# Patient Record
Sex: MALE | Race: OTHER | Employment: OTHER | URBAN - METROPOLITAN AREA
[De-identification: names, ages, dates, MRNs, and addresses within clinical notes are randomized per-mention and may not be internally consistent; named-entity substitution may affect disease eponyms.]

---

## 2018-03-21 NOTE — PATIENT DISCUSSION
(H10.89) Other conjunctivitis - Assesment : Examination reveals conjunctivitis, likely allergic conjunctivitis. Does not appear to be bacterial or viral. - Plan : Discussed symptoms and treatment options with patient. Recommend Bepreve OU BID allergy drops along with artificial tears OU BID-TID and PRN for comfort. Advised patient to call our office with decreased vision or increased symptoms. Written Rx for Bepreve given to patient. Handouts given on dry eye, samples of Bepreve and ATs given. RV in 1-2 Weeks for Follow Up, or RV as needed or with worsening symptoms.

## 2018-04-05 NOTE — PATIENT DISCUSSION
Viral Conjunctivits OU -- The condition was discussed with the patient. Cool compresses and artificial tears were recommended. The mechanism of transmission was explained and the patient was educated to wash hands and use separate towels and bedding.

## 2018-04-05 NOTE — PATIENT DISCUSSION
1.  Bacterial Conjunctivitis OU with secondary viral --The condition was discussed with the patient. The mechanism of transmission was explained. The patient was advised to wash his hands and use separate towels and bedding. Pt was given Tobrx and pt feels it was worse. Dc Tobrex. Rx Vigamox tid and PF tid ou. Tears prn and keep clean. 2.   RTN 10 days OC

## 2018-04-16 NOTE — PATIENT DISCUSSION
1.  Nuclear Sclerotic Cataract OU:  OD>OS. OD affecting visions. Explained how cataracts can effect vision. Recommend clinical observation. The patient was advised to contact us if any change or worsening of vision. 2. Dry Eyes OU:  Start artificials tears. Encouraged regular use. 3.  Bacterial Conjunctivitis OU with secondary viral --  Much better-  Dc Vigamox PF bid 5 days qd 5 days then DC. Use tears. Tears prn and keep clean. 4. RTN 6 mths dFTA- eval OD cat.

## 2018-05-07 NOTE — PATIENT DISCUSSION
1.  Viral Conjunctivitis OU with secondary bacterial --The condition was discussed with the patient. The mechanism of transmission was explained. The patient was advised to wash his hands and use separate towels and bedding. Pt was doing well with previous meds. Pt stopped meds and started using her makeup that she used when she was infected previously. Possibly recontamination. Rx Vigamox tid and Lotemax tid ou. Tears prn and keep clean. Discard all makeup and dont use new stuff. Do a VERY slow taper.   2.   RTN 10 days OC

## 2018-05-18 NOTE — PATIENT DISCUSSION
1.  Viral Conjunctivitis OU with secondary bacterial --  Much improved. The condition was discussed with the patient. The mechanism of transmission was explained. The patient was advised to wash his hands and use separate towels and bedding. Pt was doing well with previous meds. Pt stopped meds and started using her makeup that she used when she was infected previously. Possibly recontamination. Rx Vigamox tid and Lotemax tid ou. Tears prn and keep clean. Discard all makeup and dont use new stuff. Do a VERY slow taper.   2.   RTN 10 days OC

## 2018-05-18 NOTE — PATIENT DISCUSSION
1.  Viral Conjunctivitis OU with secondary bacterial --  Much improved. The condition was discussed with the patient. The mechanism of transmission was explained. The patient was advised to wash his hands and use separate towels and bedding. Pt was doing well with previous meds. Pt discarded  her makeup. Finish Vigamox bid and Taper Lotemax bid 10 days qd 10 days then DC. Tears prn and keep clean. Discard all makeup and dont use new stuff. Do a VERY slow taper.  2.   RTN 20 days OC

## 2018-06-07 NOTE — PATIENT DISCUSSION
1.  Viral Conjunctivitis OU with secondary bacterial --  Resolved. Did a very slow taper and pt doing better. Tears prn and keep clean. Discard all makeup and dont use new stuff. Do a VERY slow taper. 2.   SPK OU-  use tears at least 2-3x per day. 3.   RTN  PRN

## 2020-10-09 NOTE — PATIENT DISCUSSION
1.  Nuclear Sclerotic Cataract OU:  OD>OS. OD affecting visions. Getting worse and affecting visions. Explained how cataracts can effect vision. Disc wit pt and wants to ahve evaluation for sx. Pt has been used to [facility] and would like same. PORE -1.75/plano2. Dry Eyes OU:  Start artificials tears. Encouraged regular use. 3. 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

## 2020-11-05 NOTE — PATIENT DISCUSSION
1.  Discussed the risks benefits alternatives and limitations of cataract surgery including infection bleeding loss of vision retinal tears detachment. The patient stated a full understanding and a desire to proceed with the procedure in both eyes. Refractive options were reviewed. Patient has elected to have monovision. The patient has tried monovision in the past.  I have recommended ORA guidance to confirm lens power choice. There is no guarantee of perfect uncorrected acuity and the possible need for enhancement was discussed. The patient may need glasses for night driving and to fine tune the vision. Schedule KPE/IOL OS/OD pore -1.75OD plano OS2. Dry Eye OU:  Continue current management with Artificial Tears. 3.  Return for an appointment for Admit. with Dr. Maile Lux.

## 2020-12-02 NOTE — PATIENT DISCUSSION
Cataract OU: Discussed the risks benefits alternatives and limitations of cataract surgery including infection bleeding loss of vision retinal tears detachment. The patient stated a full understanding and a desire to proceed with the surgery in both eyes. Refractive options were reviewed. Patient has elected to have monovision and has tried monovision in the past.  The patient will have ORA guidance to confirm lens power choice. There is no guarantee of perfect uncorrected acuity and the possible need for enhancement was discussed. The patient may need glasses for night driving and to fine tune the vision.

## 2020-12-02 NOTE — PATIENT DISCUSSION
Cataract OD: Discussed the risks benefits alternatives and limitations of cataract surgery including infection bleeding loss of vision retinal tears detachment. The patient stated a full understanding and a desire to proceed with the procedure in the right eye. Refractive options were reviewed. Patient has elected to be optimized for near vision in the right eye. The patient will need glasses for distance.

## 2020-12-15 NOTE — PATIENT DISCUSSION
1.  Pseudophakia OD - Post-Op Day #1 - Cataract Surgery Right Eye (OD) -[facility]man-   MONO--OD PORE -1.75--doing well. Continue po drops as instructed. Call office with symptoms of pain redness or decreased vision right eye. Explained to pt and she saw how well she was seeing in new eye. 2.  Cat scheduled 1 week3. Use more tears qwid-gave sample4.   RTN 1 week PO

## 2020-12-22 NOTE — PATIENT DISCUSSION
1.  Pseudophake OS--Post-Op Day #1 - Cataract Surgery Left Eye (OS) - DIST--Destenza--doing well. Tears prn. Continue postop drops as directed. Call office with symptoms of pain redness or decreased vision in operative eye.  1 drop of tobramycin instilled2. Pseudophakia OD - 1 week PO - Cataract Surgery Right Eye (OD) -[facility]-   MONO--NEAR---doing well. Continue po drops as instructed. Call office with symptoms of pain redness or decreased vision right eye. Explained to pt and she saw how well she was seeing in new eye. 3.  Use more tears qid-gave sample4. MONO--5.   RTN 1 week PO

## 2021-02-10 NOTE — PATIENT DISCUSSION
1.  Pseudophake OU--  MONO_-- DIST-OS/Near OD. --Destenza-- 2. Use more tears qid-gave sample3. MONO--4.   RTN  5 mths CE--after cat sx ou

## 2021-06-16 NOTE — PATIENT DISCUSSION
1.  Pseudophake OU--  MONO_-- DIST-OS/Near OD. --Destenza-- Pt happy 2.  DRy eyes---Use more tears qid-gave sample3. MONO--4. RTN  1 yr CE.

## 2021-06-16 NOTE — PATIENT DISCUSSION
1.  Pseudophake OU--  MONO_-- DIST-OS/Near OD. --Destenza-- Pt happy 2.  DRy eyes---Use more tears qid-gave sample3. MONO--4.   RTN  1 yr CE

## 2022-01-21 ENCOUNTER — CONSULTATION/EVALUATION (OUTPATIENT)
Dept: URBAN - METROPOLITAN AREA CLINIC 36 | Facility: CLINIC | Age: 72
End: 2022-01-21

## 2022-01-21 DIAGNOSIS — H40.021: ICD-10-CM

## 2022-01-21 DIAGNOSIS — H25.813: ICD-10-CM

## 2022-01-21 DIAGNOSIS — H35.371: ICD-10-CM

## 2022-01-21 PROCEDURE — 92136TC INTERFEROMETRY - TECHNICAL COMPONENT

## 2022-01-21 PROCEDURE — 92014 COMPRE OPH EXAM EST PT 1/>: CPT

## 2022-01-21 PROCEDURE — 92025-3 CORNEAL TOPO, REFUSED

## 2022-01-21 PROCEDURE — 92134 CPTRZ OPH DX IMG PST SGM RTA: CPT

## 2022-01-21 RX ORDER — KETOROLAC TROMETHAMINE 5 MG/ML: 1 SOLUTION OPHTHALMIC

## 2022-01-21 RX ORDER — MOXIFLOXACIN OPHTHALMIC 5 MG/ML: 1 SOLUTION/ DROPS OPHTHALMIC

## 2022-01-21 ASSESSMENT — VISUAL ACUITY
OD_RAM: 20/30
OS_BAT: 20/40
OS_SC: J1
OS_CC: 20/25-1
OD_SC: 20/200
OS_AM: 20/20
OD_CC: 20/80
OD_CC: J12
OS_SC: 20/40-1
OD_SC: J12
OS_CC: J1

## 2022-01-21 ASSESSMENT — TONOMETRY
OD_IOP_MMHG: 14
OS_IOP_MMHG: 13

## 2022-02-01 ENCOUNTER — SURGERY/PROCEDURE (OUTPATIENT)
Dept: URBAN - METROPOLITAN AREA CLINIC 36 | Facility: CLINIC | Age: 72
End: 2022-02-01

## 2022-02-01 ENCOUNTER — PRE-OP/H&P (OUTPATIENT)
Dept: URBAN - METROPOLITAN AREA CLINIC 39 | Facility: CLINIC | Age: 72
End: 2022-02-01

## 2022-02-01 DIAGNOSIS — H25.813: ICD-10-CM

## 2022-02-01 DIAGNOSIS — H57.03: ICD-10-CM

## 2022-02-01 DIAGNOSIS — H40.021: ICD-10-CM

## 2022-02-01 DIAGNOSIS — H21.81: ICD-10-CM

## 2022-02-01 DIAGNOSIS — H27.8: ICD-10-CM

## 2022-02-01 DIAGNOSIS — Z96.1: ICD-10-CM

## 2022-02-01 DIAGNOSIS — H25.811: ICD-10-CM

## 2022-02-01 PROCEDURE — 66982 XCAPSL CTRC RMVL CPLX WO ECP: CPT

## 2022-02-01 PROCEDURE — 99211HP H&P OFFICE/OUTPATIENT VISIT, EST

## 2022-02-02 ENCOUNTER — POST-OP (OUTPATIENT)
Dept: URBAN - METROPOLITAN AREA CLINIC 36 | Facility: CLINIC | Age: 72
End: 2022-02-02

## 2022-02-02 DIAGNOSIS — Z96.1: ICD-10-CM

## 2022-02-02 PROCEDURE — 99024 POSTOP FOLLOW-UP VISIT: CPT

## 2022-02-02 ASSESSMENT — TONOMETRY
OS_IOP_MMHG: 13
OD_IOP_MMHG: 14

## 2022-02-02 ASSESSMENT — VISUAL ACUITY
OD_SC: 20/50-1
OS_SC: 20/25-1
OS_SC: J2

## 2022-02-04 ENCOUNTER — POST-OP (OUTPATIENT)
Dept: URBAN - METROPOLITAN AREA CLINIC 46 | Facility: CLINIC | Age: 72
End: 2022-02-04

## 2022-02-04 ENCOUNTER — POST-OP (OUTPATIENT)
Dept: URBAN - METROPOLITAN AREA CLINIC 36 | Facility: CLINIC | Age: 72
End: 2022-02-04

## 2022-02-04 DIAGNOSIS — Z96.1: ICD-10-CM

## 2022-02-04 PROCEDURE — 99024 POSTOP FOLLOW-UP VISIT: CPT

## 2022-02-04 ASSESSMENT — VISUAL ACUITY
OS_CC: 20/25
OD_SC: 20/100
OS_SC: 20/50-2
OS_PH: 20/40
OD_SC: 20/100

## 2022-02-04 ASSESSMENT — TONOMETRY
OS_IOP_MMHG: 14
OD_IOP_MMHG: 16
OD_IOP_MMHG: 14
OS_IOP_MMHG: 14

## 2022-02-07 ENCOUNTER — PRE-OP/H&P (OUTPATIENT)
Dept: URBAN - METROPOLITAN AREA SURGERY 14 | Facility: SURGERY | Age: 72
End: 2022-02-07

## 2022-02-07 ENCOUNTER — SURGERY/PROCEDURE (OUTPATIENT)
Dept: URBAN - METROPOLITAN AREA CLINIC 36 | Facility: CLINIC | Age: 72
End: 2022-02-07

## 2022-02-07 DIAGNOSIS — H59.021: ICD-10-CM

## 2022-02-07 DIAGNOSIS — Z96.1: ICD-10-CM

## 2022-02-07 PROCEDURE — 99211HP H&P OFFICE/OUTPATIENT VISIT, EST

## 2022-02-07 PROCEDURE — 66840 REMOVAL OF LENS MATERIAL: CPT

## 2022-02-08 ENCOUNTER — POST-OP (OUTPATIENT)
Dept: URBAN - METROPOLITAN AREA CLINIC 36 | Facility: CLINIC | Age: 72
End: 2022-02-08

## 2022-02-08 DIAGNOSIS — Z96.1: ICD-10-CM

## 2022-02-08 PROCEDURE — 99024 POSTOP FOLLOW-UP VISIT: CPT

## 2022-02-08 ASSESSMENT — VISUAL ACUITY
OS_SC: J1
OD_SC: J>12
OS_SC: 20/40
OD_SC: 20/50

## 2022-02-08 ASSESSMENT — TONOMETRY
OS_IOP_MMHG: 07
OD_IOP_MMHG: 07

## 2022-03-02 ENCOUNTER — POST-OP (OUTPATIENT)
Dept: URBAN - METROPOLITAN AREA CLINIC 36 | Facility: CLINIC | Age: 72
End: 2022-03-02

## 2022-03-02 DIAGNOSIS — Z96.1: ICD-10-CM

## 2022-03-02 DIAGNOSIS — H59.021: ICD-10-CM

## 2022-03-02 PROCEDURE — 99024 POSTOP FOLLOW-UP VISIT: CPT

## 2022-03-02 ASSESSMENT — TONOMETRY
OD_IOP_MMHG: 08
OS_IOP_MMHG: 10

## 2022-03-02 ASSESSMENT — VISUAL ACUITY
OS_SC: 20/40-1
OS_SC: J2
OD_SC: J8
OD_SC: 20/30

## 2023-01-27 ENCOUNTER — CONSULTATION/EVALUATION (OUTPATIENT)
Dept: URBAN - METROPOLITAN AREA CLINIC 36 | Facility: CLINIC | Age: 73
End: 2023-01-27

## 2023-01-27 DIAGNOSIS — H35.371: ICD-10-CM

## 2023-01-27 DIAGNOSIS — H25.812: ICD-10-CM

## 2023-01-27 DIAGNOSIS — H26.491: ICD-10-CM

## 2023-01-27 PROCEDURE — 92136TC INTERFEROMETRY - TECHNICAL COMPONENT

## 2023-01-27 PROCEDURE — 92004 COMPRE OPH EXAM NEW PT 1/>: CPT

## 2023-01-27 PROCEDURE — 92134 CPTRZ OPH DX IMG PST SGM RTA: CPT

## 2023-01-27 RX ORDER — KETOROLAC TROMETHAMINE 5 MG/ML: 1 SOLUTION OPHTHALMIC

## 2023-01-27 RX ORDER — PREDNISOLONE ACETATE 10 MG/ML: 1 SUSPENSION/ DROPS OPHTHALMIC

## 2023-01-27 RX ORDER — MOXIFLOXACIN OPHTHALMIC 5 MG/ML: 1 SOLUTION/ DROPS OPHTHALMIC

## 2023-01-27 ASSESSMENT — VISUAL ACUITY
OS_SC: 20/50
OS_SC: J2
OS_PH: 20/30-2
OS_BAT: 20/50
OD_SC: 20/20-1
OD_SC: J6
OD_BAT: 20/30

## 2023-01-27 ASSESSMENT — TONOMETRY
OD_IOP_MMHG: 07
OS_IOP_MMHG: 09

## 2023-02-21 NOTE — PATIENT DISCUSSION
Nuclear Sclerotic Cataract OD: Discussed the risks benefits alternatives and limitations of cataract surgery including infection bleeding loss of vision retinal tears detachment. The patient stated a full understanding and a desire to proceed with the procedure in the right eye. Refractive options were reviewed. Patient has elected to be optimized for distance vision in the right eye. The patient will still need glasses for reading and to possibly fine tune distance vision. Schedule KPE/IOL OD/OS. Pt has tried and succeeded with monovision. OD near OS distance pore -1.75. ORA recommended. Benzoyl Peroxide Pregnancy And Lactation Text: This medication is Pregnancy Category C. It is unknown if benzoyl peroxide is excreted in breast milk.

## 2024-05-06 NOTE — PATIENT DISCUSSION
1.  Post-Op Week #2 -  Cataract Surgery Right Eye (OD) - Intraocular lens stable and surgery very well healed. Patient to resume all normal activities. Finish postop drops as directed. Final Refraction given if necessary. 2. Post-Op Week #1 - Cataract Surgery Left Eye (OS) -  Intraocular lens stable and surgery very well healed. Patient to resume all normal activities. Finish postop drops as directed. Final Refraction given if necessary. Call with any problems. 3. Return for an appointment in 1 month for post op exam. with Dr. Michaela Randolph No care due was identified.  [facility] Embedded Care Due Messages. Reference number: [id number].   5/06/2024 7:36:21 AM CDT